# Patient Record
Sex: FEMALE | Race: WHITE | Employment: FULL TIME | URBAN - METROPOLITAN AREA
[De-identification: names, ages, dates, MRNs, and addresses within clinical notes are randomized per-mention and may not be internally consistent; named-entity substitution may affect disease eponyms.]

---

## 2017-01-06 PROCEDURE — 81003 URINALYSIS AUTO W/O SCOPE: CPT | Performed by: FAMILY MEDICINE

## 2017-01-06 PROCEDURE — 86160 COMPLEMENT ANTIGEN: CPT | Performed by: FAMILY MEDICINE

## 2017-01-06 PROCEDURE — 85652 RBC SED RATE AUTOMATED: CPT | Performed by: FAMILY MEDICINE

## 2017-01-06 PROCEDURE — 86225 DNA ANTIBODY NATIVE: CPT | Performed by: FAMILY MEDICINE

## 2017-01-09 PROBLEM — H35.372 EPIRETINAL MEMBRANE, LEFT: Status: ACTIVE | Noted: 2017-01-09

## 2017-01-09 PROBLEM — H25.093 CATARACT INCIPIENT, SENILE, BILATERAL: Status: ACTIVE | Noted: 2017-01-09

## 2017-01-09 PROBLEM — Z79.899 LONG-TERM USE OF PLAQUENIL: Status: ACTIVE | Noted: 2017-01-09

## 2017-01-09 PROBLEM — H49.12 4TH NERVE PALSY, LEFT: Status: ACTIVE | Noted: 2017-01-09

## 2017-05-06 PROCEDURE — 86225 DNA ANTIBODY NATIVE: CPT | Performed by: FAMILY MEDICINE

## 2017-05-06 PROCEDURE — 86160 COMPLEMENT ANTIGEN: CPT | Performed by: FAMILY MEDICINE

## 2017-05-06 PROCEDURE — 81003 URINALYSIS AUTO W/O SCOPE: CPT | Performed by: FAMILY MEDICINE

## 2017-06-21 ENCOUNTER — APPOINTMENT (OUTPATIENT)
Dept: OTHER | Age: 57
End: 2017-06-21
Attending: PREVENTIVE MEDICINE
Payer: OTHER MISCELLANEOUS

## 2017-06-21 ENCOUNTER — HOSPITAL ENCOUNTER (EMERGENCY)
Age: 57
Discharge: HOME | End: 2017-06-21
Attending: EMERGENCY MEDICINE
Payer: OTHER MISCELLANEOUS

## 2017-06-21 VITALS
DIASTOLIC BLOOD PRESSURE: 91 MMHG | SYSTOLIC BLOOD PRESSURE: 168 MMHG | BODY MASS INDEX: 26.66 KG/M2 | TEMPERATURE: 98 F | HEIGHT: 65 IN | OXYGEN SATURATION: 100 % | HEART RATE: 108 BPM | WEIGHT: 160 LBS | RESPIRATION RATE: 16 BRPM

## 2017-06-21 DIAGNOSIS — S91.331A PUNCTURE WOUND OF FOOT, RIGHT, INITIAL ENCOUNTER: Primary | ICD-10-CM

## 2017-06-21 PROCEDURE — 99283 EMERGENCY DEPT VISIT LOW MDM: CPT

## 2017-06-21 RX ORDER — CIPROFLOXACIN 500 MG/1
500 TABLET, FILM COATED ORAL 2 TIMES DAILY
Qty: 14 TABLET | Refills: 0 | Status: SHIPPED | OUTPATIENT
Start: 2017-06-21 | End: 2017-06-28

## 2017-06-21 NOTE — ED PROVIDER NOTES
Patient Seen in: [facility] In [place]    History   Patient presents with:  Lower Extremity Injury (musculoskeletal)    Stated Complaint: right foot injury at work    AVINASH    Analia Ball is a 22-year-old female who presents today for evalua Cholecalciferol (VITAMIN D) 2000 UNITS Oral Cap,  Take by mouth. Vitamin B-12 1000 MCG Oral Tab,  Take 1,000 mcg by mouth daily. Biotin 5000 MCG Oral Cap,  Take by mouth. Calcium Carbonate-Vit D-Min (CALCIUM 1200 OR),  Take by mouth.    celecoxib (RAFAEL time.   Skin: Skin is warm and dry. Puncture wound to the middle of the plantar surface of the foot mildly tender to palpation. No local erythema or purulent drainage. Nursing note and vitals reviewed.       ED Course   Labs Reviewed - No data to displ

## 2017-10-05 PROCEDURE — 86160 COMPLEMENT ANTIGEN: CPT | Performed by: FAMILY MEDICINE

## 2017-10-05 PROCEDURE — 86225 DNA ANTIBODY NATIVE: CPT | Performed by: FAMILY MEDICINE

## 2017-10-05 PROCEDURE — 85652 RBC SED RATE AUTOMATED: CPT | Performed by: FAMILY MEDICINE

## 2017-10-05 PROCEDURE — 81003 URINALYSIS AUTO W/O SCOPE: CPT | Performed by: FAMILY MEDICINE

## 2017-11-20 PROCEDURE — 88175 CYTOPATH C/V AUTO FLUID REDO: CPT | Performed by: OBSTETRICS & GYNECOLOGY

## 2018-02-23 ENCOUNTER — LAB ENCOUNTER (OUTPATIENT)
Dept: LAB | Age: 58
End: 2018-02-23
Attending: INTERNAL MEDICINE
Payer: COMMERCIAL

## 2018-02-23 DIAGNOSIS — M15.9 PRIMARY OSTEOARTHRITIS INVOLVING MULTIPLE JOINTS: Primary | ICD-10-CM

## 2018-02-23 DIAGNOSIS — Z79.899 ENCOUNTER FOR LONG-TERM (CURRENT) DRUG USE: ICD-10-CM

## 2018-02-23 DIAGNOSIS — M32.9 SYSTEMIC LUPUS ERYTHEMATOSUS (HCC): ICD-10-CM

## 2018-02-23 LAB
ALBUMIN SERPL-MCNC: 4.1 G/DL (ref 3.5–4.8)
ALP LIVER SERPL-CCNC: 94 U/L (ref 46–118)
ALT SERPL-CCNC: 22 U/L (ref 14–54)
AST SERPL-CCNC: 16 U/L (ref 15–41)
BASOPHILS # BLD AUTO: 0.03 X10(3) UL (ref 0–0.1)
BASOPHILS NFR BLD AUTO: 0.7 %
BILIRUB SERPL-MCNC: 0.5 MG/DL (ref 0.1–2)
BILIRUB UR QL STRIP.AUTO: NEGATIVE
BUN BLD-MCNC: 15 MG/DL (ref 8–20)
CALCIUM BLD-MCNC: 9.8 MG/DL (ref 8.3–10.3)
CHLORIDE: 103 MMOL/L (ref 101–111)
CO2: 32 MMOL/L (ref 22–32)
COLOR UR AUTO: YELLOW
COMPLEMENT C3: 123 MG/DL (ref 90–180)
COMPLEMENT C4: 24.7 MG/DL (ref 10–40)
CREAT BLD-MCNC: 1.04 MG/DL (ref 0.55–1.02)
EOSINOPHIL # BLD AUTO: 0.08 X10(3) UL (ref 0–0.3)
EOSINOPHIL NFR BLD AUTO: 1.8 %
ERYTHROCYTE [DISTWIDTH] IN BLOOD BY AUTOMATED COUNT: 12.4 % (ref 11.5–16)
GLUCOSE BLD-MCNC: 90 MG/DL (ref 70–99)
GLUCOSE UR STRIP.AUTO-MCNC: NEGATIVE MG/DL
HCT VFR BLD AUTO: 43.7 % (ref 34–50)
HGB BLD-MCNC: 14.2 G/DL (ref 12–16)
IMMATURE GRANULOCYTE COUNT: 0 X10(3) UL (ref 0–1)
IMMATURE GRANULOCYTE RATIO %: 0 %
KETONES UR STRIP.AUTO-MCNC: NEGATIVE MG/DL
LEUKOCYTE ESTERASE UR QL STRIP.AUTO: NEGATIVE
LYMPHOCYTES # BLD AUTO: 1.33 X10(3) UL (ref 0.9–4)
LYMPHOCYTES NFR BLD AUTO: 29.8 %
M PROTEIN MFR SERPL ELPH: 7.3 G/DL (ref 6.1–8.3)
MCH RBC QN AUTO: 30.3 PG (ref 27–33.2)
MCHC RBC AUTO-ENTMCNC: 32.5 G/DL (ref 31–37)
MCV RBC AUTO: 93.4 FL (ref 81–100)
MONOCYTES # BLD AUTO: 0.27 X10(3) UL (ref 0.1–1)
MONOCYTES NFR BLD AUTO: 6.1 %
NEUTROPHIL ABS PRELIM: 2.75 X10 (3) UL (ref 1.3–6.7)
NEUTROPHILS # BLD AUTO: 2.75 X10(3) UL (ref 1.3–6.7)
NEUTROPHILS NFR BLD AUTO: 61.6 %
NITRITE UR QL STRIP.AUTO: NEGATIVE
PH UR STRIP.AUTO: 7 [PH] (ref 4.5–8)
PLATELET # BLD AUTO: 219 10(3)UL (ref 150–450)
POTASSIUM SERPL-SCNC: 4.1 MMOL/L (ref 3.6–5.1)
PROT UR STRIP.AUTO-MCNC: NEGATIVE MG/DL
RBC # BLD AUTO: 4.68 X10(6)UL (ref 3.8–5.1)
RBC UR QL AUTO: NEGATIVE
RED CELL DISTRIBUTION WIDTH-SD: 42.8 FL (ref 35.1–46.3)
SED RATE-ML: 8 MM/HR (ref 0–25)
SODIUM SERPL-SCNC: 140 MMOL/L (ref 136–144)
SP GR UR STRIP.AUTO: 1.01 (ref 1–1.03)
UROBILINOGEN UR STRIP.AUTO-MCNC: <2 MG/DL
WBC # BLD AUTO: 4.5 X10(3) UL (ref 4–13)

## 2018-02-23 PROCEDURE — 80053 COMPREHEN METABOLIC PANEL: CPT

## 2018-02-23 PROCEDURE — 86160 COMPLEMENT ANTIGEN: CPT

## 2018-02-23 PROCEDURE — 86225 DNA ANTIBODY NATIVE: CPT

## 2018-02-23 PROCEDURE — 85652 RBC SED RATE AUTOMATED: CPT

## 2018-02-23 PROCEDURE — 81003 URINALYSIS AUTO W/O SCOPE: CPT

## 2018-02-23 PROCEDURE — 85025 COMPLETE CBC W/AUTO DIFF WBC: CPT

## 2018-06-27 PROBLEM — Z79.899 EYE EXAM DUE TO HIGH RISK MEDICATION, ENCOUNTER FOR: Status: ACTIVE | Noted: 2018-06-27

## 2018-06-27 PROBLEM — L93.0 DISCOID LUPUS: Status: ACTIVE | Noted: 2018-06-27

## 2018-09-10 PROBLEM — G47.09 OTHER INSOMNIA: Status: ACTIVE | Noted: 2018-09-10

## 2018-09-17 PROCEDURE — 86160 COMPLEMENT ANTIGEN: CPT | Performed by: FAMILY MEDICINE

## 2018-09-17 PROCEDURE — 81001 URINALYSIS AUTO W/SCOPE: CPT | Performed by: FAMILY MEDICINE

## 2018-09-17 PROCEDURE — 86803 HEPATITIS C AB TEST: CPT | Performed by: FAMILY MEDICINE

## 2018-09-17 PROCEDURE — 86225 DNA ANTIBODY NATIVE: CPT | Performed by: FAMILY MEDICINE

## 2018-11-19 PROCEDURE — 82570 ASSAY OF URINE CREATININE: CPT | Performed by: FAMILY MEDICINE

## 2018-11-19 PROCEDURE — 82043 UR ALBUMIN QUANTITATIVE: CPT | Performed by: FAMILY MEDICINE

## 2018-12-10 PROBLEM — G89.29 CHRONIC PAIN OF LEFT KNEE: Status: ACTIVE | Noted: 2018-12-10

## 2018-12-10 PROBLEM — M25.562 CHRONIC PAIN OF LEFT KNEE: Status: ACTIVE | Noted: 2018-12-10

## 2019-02-25 PROCEDURE — 81003 URINALYSIS AUTO W/O SCOPE: CPT | Performed by: FAMILY MEDICINE

## 2019-02-25 PROCEDURE — 86160 COMPLEMENT ANTIGEN: CPT | Performed by: FAMILY MEDICINE

## 2019-02-25 PROCEDURE — 86225 DNA ANTIBODY NATIVE: CPT | Performed by: FAMILY MEDICINE

## 2019-09-17 PROCEDURE — 86160 COMPLEMENT ANTIGEN: CPT | Performed by: FAMILY MEDICINE

## 2019-09-17 PROCEDURE — 86256 FLUORESCENT ANTIBODY TITER: CPT | Performed by: FAMILY MEDICINE

## 2020-05-28 PROBLEM — F41.1 GAD (GENERALIZED ANXIETY DISORDER): Status: ACTIVE | Noted: 2020-05-28

## 2021-01-25 PROBLEM — R07.89 ATYPICAL CHEST PAIN: Status: ACTIVE | Noted: 2021-01-25

## 2021-05-10 PROBLEM — R87.615 UNSATISFACTORY CERVICAL PAPANICOLAOU SMEAR: Status: ACTIVE | Noted: 2021-05-10

## 2021-08-10 PROBLEM — S00.06XA TICK BITE OF SCALP, INITIAL ENCOUNTER: Status: ACTIVE | Noted: 2021-08-10
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
## 2021-12-14 PROBLEM — M25.561 CHRONIC PAIN OF RIGHT KNEE: Status: ACTIVE | Noted: 2021-12-14

## 2021-12-14 PROBLEM — G89.29 CHRONIC PAIN OF RIGHT KNEE: Status: ACTIVE | Noted: 2021-12-14

## 2021-12-16 PROBLEM — M76.899 HAMSTRING TENDONITIS: Status: ACTIVE | Noted: 2021-12-16

## 2021-12-16 PROBLEM — Z86.39 HISTORY OF THYROID NODULE: Status: ACTIVE | Noted: 2021-12-16

## 2022-06-26 ENCOUNTER — APPOINTMENT (OUTPATIENT)
Dept: GENERAL RADIOLOGY | Age: 62
End: 2022-06-26
Attending: EMERGENCY MEDICINE
Payer: COMMERCIAL

## 2022-06-26 ENCOUNTER — HOSPITAL ENCOUNTER (EMERGENCY)
Age: 62
Discharge: HOME | End: 2022-06-27
Attending: EMERGENCY MEDICINE
Payer: COMMERCIAL

## 2022-06-26 VITALS
SYSTOLIC BLOOD PRESSURE: 151 MMHG | OXYGEN SATURATION: 99 % | RESPIRATION RATE: 18 BRPM | DIASTOLIC BLOOD PRESSURE: 75 MMHG | HEART RATE: 85 BPM | WEIGHT: 165 LBS | TEMPERATURE: 99 F | BODY MASS INDEX: 28 KG/M2

## 2022-06-26 DIAGNOSIS — M25.562 ACUTE PAIN OF LEFT KNEE: Primary | ICD-10-CM

## 2022-06-26 PROCEDURE — 99284 EMERGENCY DEPT VISIT MOD MDM: CPT

## 2022-06-26 PROCEDURE — 99283 EMERGENCY DEPT VISIT LOW MDM: CPT

## 2022-06-26 PROCEDURE — 73562 X-RAY EXAM OF KNEE 3: CPT | Performed by: EMERGENCY MEDICINE

## 2022-06-26 RX ORDER — HYDROCODONE BITARTRATE AND ACETAMINOPHEN 5; 325 MG/1; MG/1
1 TABLET ORAL ONCE
Status: COMPLETED | OUTPATIENT
Start: 2022-06-26 | End: 2022-06-26

## 2022-06-26 RX ORDER — HYDROCODONE BITARTRATE AND ACETAMINOPHEN 5; 325 MG/1; MG/1
1-2 TABLET ORAL EVERY 6 HOURS PRN
Qty: 10 TABLET | Refills: 0 | Status: SHIPPED | OUTPATIENT
Start: 2022-06-26 | End: 2022-07-01

## 2022-06-27 NOTE — ED INITIAL ASSESSMENT (HPI)
Pt states she was turning to get out of bed and felt a pop in her L knee. Unable to bare weight on L leg.

## (undated) NOTE — ED AVS SNAPSHOT
THE [facility] Emergency Department in [address] [facility]    Phone:  [phone]    Fax:  [phone]           Felisa Paulina   MRN: [id number]    Department:  THE [facility] Emergency Department in [place]   Date of Visit: counter probiotics. If you'd like, you can have 2 servings of yogurt/Kefir daily instead. Probiotics increase the good bacteria in your gut while the antibiotic fights the bad bacteria that is causing your infection.   The good bacteria in your gut act as a substitute for ongoing medical care. Often, one Emergency Department visit does not uncover every injury or illness.  If you have been referred to a primary care or a specialist physician for a follow-up visit, please tell this physician (or your personal [place] Brad Jimenez (Rodflorine Poles) [phone] [facility]. ([address]) [phone]                Additional Information       We are concerned for your overall well being:    - If you are a smoker or have smoked in the las

## (undated) NOTE — ED AVS SNAPSHOT
THE [facility] Emergency Department in [address] [facility]    Phone:  [phone]    Fax:  [phone]           Nvaa Carpenter   MRN: [id number]    Department:  THE [facility] Emergency Department in [place]   Date of Visit: IF THERE IS ANY CHANGE OR WORSENING OF YOUR CONDITION, CALL YOUR PRIMARY CARE PHYSICIAN AT ONCE OR RETURN IMMEDIATELY TO THE EMERGENCY DEPARTMENT.     If you have been prescribed any medication(s), please fill your prescription right away and begin taking t